# Patient Record
(demographics unavailable — no encounter records)

---

## 2024-12-24 NOTE — PHYSICAL EXAM
[2+] : left 2+ [Varicose Veins Of Lower Extremities] : bilaterally [Ankle Swelling On The Left] : moderate [] : present [Ankle Swelling On The Right] : mild [No Rash or Lesion] : No rash or lesion [Alert] : alert [Oriented to Person] : oriented to person [Oriented to Place] : oriented to place [Oriented to Time] : oriented to time [Calm] : calm [Ankle Swelling (On Exam)] : not present [de-identified] : NAD [de-identified] : NCAT [de-identified] : unlabored breathing [FreeTextEntry1] : bilateral lower extremities soft, warm and nontender extensive spider, reticular and small varicosities in bilateral thighs/calves no significant leg edema venous stasis changes with mild hyperpigmentation no wounds

## 2024-12-24 NOTE — HISTORY OF PRESENT ILLNESS
[FreeTextEntry1] : Pt presents for re-evaluation of bilateral lower extremities. History of venous insufficiency and symptomatic varicose veins. Pt complains of burning and painful varicose veins on both legs. She is compliant with leg elevation, compression stockings and NSAIDS without symptom relief. The symptoms are worse from prolonged sitting and standing. Denies leg swelling. Denies claudication, rest pain or wounds. She elevates her legs and wears compression stockings daily.

## 2024-12-24 NOTE — ASSESSMENT
[Arterial/Venous Disease] : arterial/venous disease [FreeTextEntry1] : Impression - venous insufficiency with symptomatic bilateral varicose veins   Plan Conservative medical management - leg elevation, calf muscle exercises, weight management, diet control, thigh high 20-30 mm hg compression stockings d/w pt indication, risks and benefits of bilateral stab phlebectomy with UGFS Each extremity will need 2 sessions. Will start with right leg first followed by the left leg. pt agrees and wishes to proceed bilateral stab phlebectomy with UGFS to be scheduled

## 2024-12-24 NOTE — DATA REVIEWED
[FreeTextEntry1] : 12/20/2024 bilateral lower extremities venous duplex no acute dvt or svt no reflux noted bilateral superficial varicose veins > 3mm

## 2025-05-27 NOTE — PROCEDURE
[FreeTextEntry1] : right stab phlebectomy with UGFS tributary veins [FreeTextEntry3] : Procedural safety checklist and time out completed: Confirmed patient identification (Patient Name, , and/or medical record number including, when possible, affirmation by patient or parent/family/other. Confirmed procedure with the patient. Consent present, accurate and signed. Confirmed special equipment and supplies are present. Sterility confirmed. Position verified. Site/ side is marked and visible and confirmed. Procedure confirmed by consent. Accurate consent including side and site. Review of medical records noting correct procedure including site and side. MD/PA verifies presence and review of imaging studies and or written report of imaging studies. Specify equipment are available for the planned procedure. MD/PA has marked the patient's procedural site and side. Agreement on the procedure to be performed. Time out completed. All of the above has been confirmed by the team. All patient-specific concerns have been addressed.   Indication:  right lower extremity varicose veins with inflammation, leg pain, leg swelling, and leg cramping.   Procedure: Stab phlebectomy right lower extremity with ultrasound guided foam sclerotherapy of the tributary veins    Ms. LUIS MUNOZ is a 44 year old F with a history of symptomatic right lower extremity varicose veins. A trial of compression stockings, exercise, elevation, and pain medication was attempted without relief and definitive treatment with microphlebectomy was offered.   I have discussed the risks of the procedure at length with the patient. The risks discussed were inclusive of but not limited to infection, irritation at the site of infiltration of local anesthesia, and also rare risk of deep venous thrombosis and pulmonary emboli. Risks of itching, superficial thrombophlebitis, hyperpigmentation (darkening of the skin), allergic reactions, skin irritation due to extravasation of the sclerosant, air-embolism, and in rare cases deep vein thrombosis were also all discussed with the patient. The patient agrees to proceed with the procedure. The patient was escorted into the procedure room, the varicose veins for treatment were marked out and the patient placed on the examination table. The entire limb was prepped and draped in sterile fashion and a time out was called.   Local anesthesia using 25 cc 1% lidocaine was infiltrated using a 25-gauge needle over the previously marked prominent varicose vein sites. Multiple small stab incisions, each less than 1 cm in length was made at the noted sites. With the help of a vein hook, the vein was fished out at each of these sites, rolled over a narrow-tipped mosquito clamp and removed.   SIDE - RIGHT  SITE - CALF / THIGH LOCATION - MEDIAL / LATERAL / POSTERIOR Total Stab Incisions < 20     1.0 ml of 0.5% polidocanol was mixed with 4 ml air. This was repeated twice. The veins were cannulated with a 27G butterfly needle. The foam solution injected and appropriate visualization of the foam going into the veins was achieved using direct ultrasound guidance. This was repeated at multilpe sites until the entire 8 ml of the foam solution was injected. Every injected area was immediately compressed with gauze. Repeat ultrasound of the treated vein was performed confirming successful treatment. Medication: polidocanol 0.5% lot # [id number] exp 2027   Hemostasis was secured with leg elevation and application of manual pressure. After assuring hemostasis, a sterile 4x4 was placed on the access sites and an ACE compression wrap was applied. Estimated blood loss: minimal. Patient tolerated procedure well. Patient was given post-procedure instructions and follow up appointment was scheduled. Estimated Blood Loss: minimal. Patient was given post-procedure instructions and follow up appointment with ultrasound was scheduled.

## 2025-07-08 NOTE — PROCEDURE
[FreeTextEntry1] : ultrasound guided foam sclerotherapy of the tributary veins [FreeTextEntry3] : Procedural safety checklist and time out completed: Confirmed patient identification (Patient Name, , and/or medical record number including when possible affirmation by patient or parent/family/other). Confirmed procedure with the patient. Consent present, accurate and signed. Confirmed special equipment and supplies are present. Sterility confirmed. Position verified. Site/ side is marked and visible and confirmed. Procedure confirmed by consent. Accurate consent including side and site. Review of medical records, including venous ultrasound, noting correct procedure including site and side. MD/PA verifies presence and review of imaging studies and or written report of imaging studies. Agreement on the procedure to be performed Time out completed. All of the above has been confirmed by the team. All patient-specific concerns have been addressed.   Indication:  left lower extremity branch veins with leg ulcer, pain, leg swelling, itching, burning and leg cramping. Venous insufficiency.   Procedure: left lower extremity ultrasound guided foam sclerotherapy of the tributary veins of the GSV.   Procedure Note:  Ms. LUIS MUNOZ is a 44 year old F with left lower extremity below the knee tributary veins.   The patient has come for sclerotherapy of the left lower extremity below the knee tributary veins. I have discussed the risks of the procedure with the patient. Detailed discussion was held with the patient. Risks of itching, superficial thrombophlebitis, hyperpigmentation (darkening of the skin), allergic reactions, skin irritation due to extravasation of the sclerosant, air-embolism, and in rare cases deep vein thrombosis were all discussed with the patient. The patient agrees to the procedure. The patient was escorted into the procedure room, placed on the procedure table and a time out was called.   1.0 ml of 0.5% STS was mixed with 4 ml air. The vein was cannulated with a 27G butterfly needle. The foam solution injected and appropriate visualization of the foam going into the vein was achieved using direct ultrasound guidance. This was repeated at 2 different sites until the entire 4 ml of the foam solution was injected. Every injected area was immediately compressed with gauze. Repeat ultrasound of the treated vein was performed confirming successful treatment. After assuring hemostasis, a sterile 4x4 was placed on the access site and an ACE compression wrap was applied. Estimated Blood Loss: minimal Patient was given post-procedure instructions and follow up appointment with ultrasound was scheduled. Medication: 0.5% STS     exp 2025